# Patient Record
Sex: FEMALE | Race: OTHER
[De-identification: names, ages, dates, MRNs, and addresses within clinical notes are randomized per-mention and may not be internally consistent; named-entity substitution may affect disease eponyms.]

---

## 2023-08-31 ENCOUNTER — HOSPITAL ENCOUNTER (INPATIENT)
Dept: HOSPITAL 15 - ER | Age: 63
LOS: 3 days | Discharge: HOME | DRG: 204 | End: 2023-09-03
Admitting: INTERNAL MEDICINE
Payer: COMMERCIAL

## 2023-08-31 VITALS — HEART RATE: 77 BPM | RESPIRATION RATE: 16 BRPM | OXYGEN SATURATION: 96 %

## 2023-08-31 VITALS — HEIGHT: 61 IN | WEIGHT: 151.9 LBS | BODY MASS INDEX: 28.68 KG/M2

## 2023-08-31 DIAGNOSIS — N39.0: ICD-10-CM

## 2023-08-31 DIAGNOSIS — I65.29: ICD-10-CM

## 2023-08-31 DIAGNOSIS — E86.0: ICD-10-CM

## 2023-08-31 DIAGNOSIS — R55: Primary | ICD-10-CM

## 2023-08-31 DIAGNOSIS — I10: ICD-10-CM

## 2023-08-31 DIAGNOSIS — J45.909: ICD-10-CM

## 2023-08-31 DIAGNOSIS — Z90.710: ICD-10-CM

## 2023-08-31 DIAGNOSIS — M50.31: ICD-10-CM

## 2023-08-31 LAB
ALBUMIN SERPL-MCNC: 4.3 G/DL (ref 3.2–4.8)
ALP SERPL-CCNC: 76 U/L (ref 46–116)
ALT SERPL-CCNC: 21 U/L (ref 7–40)
ANION GAP SERPL CALCULATED.3IONS-SCNC: 7.2 MMOL/L (ref 5–15)
BILIRUB SERPL-MCNC: 0.3 MG/DL (ref 0.2–1)
BUN SERPL-MCNC: 19 MG/DL (ref 9–23)
BUN/CREAT SERPL: 26 (ref 10–20)
CALCIUM SERPL-MCNC: 9.1 MG/DL (ref 8.5–10.1)
CHLORIDE SERPL-SCNC: 109 MMOL/L (ref 98–107)
CO2 SERPL-SCNC: 24.8 MMOL/L (ref 20–30)
GLUCOSE SERPL-MCNC: 101 MG/DL (ref 74–106)
HCT VFR BLD AUTO: 38.9 % (ref 36–46)
HGB BLD-MCNC: 12.9 G/DL (ref 12.2–16.2)
MCH RBC QN AUTO: 29.4 PG (ref 28–32)
MCV RBC AUTO: 88.5 FL (ref 80–100)
NRBC BLD QL AUTO: 0.1 %
POTASSIUM SERPL-SCNC: 4.3 MMOL/L (ref 3.5–5.1)
PROT SERPL-MCNC: 6.8 G/DL (ref 5.7–8.2)
SODIUM SERPL-SCNC: 141 MMOL/L (ref 136–145)

## 2023-08-31 PROCEDURE — 81001 URINALYSIS AUTO W/SCOPE: CPT

## 2023-08-31 PROCEDURE — 70498 CT ANGIOGRAPHY NECK: CPT

## 2023-08-31 PROCEDURE — 80053 COMPREHEN METABOLIC PANEL: CPT

## 2023-08-31 PROCEDURE — 36415 COLL VENOUS BLD VENIPUNCTURE: CPT

## 2023-08-31 PROCEDURE — 96361 HYDRATE IV INFUSION ADD-ON: CPT

## 2023-08-31 PROCEDURE — 70450 CT HEAD/BRAIN W/O DYE: CPT

## 2023-08-31 PROCEDURE — 71045 X-RAY EXAM CHEST 1 VIEW: CPT

## 2023-08-31 PROCEDURE — 96375 TX/PRO/DX INJ NEW DRUG ADDON: CPT

## 2023-08-31 PROCEDURE — 96374 THER/PROPH/DIAG INJ IV PUSH: CPT

## 2023-08-31 PROCEDURE — 85025 COMPLETE CBC W/AUTO DIFF WBC: CPT

## 2023-08-31 PROCEDURE — 83880 ASSAY OF NATRIURETIC PEPTIDE: CPT

## 2023-08-31 PROCEDURE — 84443 ASSAY THYROID STIM HORMONE: CPT

## 2023-08-31 PROCEDURE — 93306 TTE W/DOPPLER COMPLETE: CPT

## 2023-08-31 PROCEDURE — 93886 INTRACRANIAL COMPLETE STUDY: CPT

## 2023-08-31 PROCEDURE — 80061 LIPID PANEL: CPT

## 2023-08-31 PROCEDURE — 84484 ASSAY OF TROPONIN QUANT: CPT

## 2023-08-31 PROCEDURE — 83036 HEMOGLOBIN GLYCOSYLATED A1C: CPT

## 2023-09-01 VITALS
DIASTOLIC BLOOD PRESSURE: 66 MMHG | OXYGEN SATURATION: 96 % | TEMPERATURE: 98.8 F | HEART RATE: 70 BPM | RESPIRATION RATE: 17 BRPM | SYSTOLIC BLOOD PRESSURE: 131 MMHG

## 2023-09-01 VITALS
RESPIRATION RATE: 16 BRPM | SYSTOLIC BLOOD PRESSURE: 134 MMHG | TEMPERATURE: 97.6 F | OXYGEN SATURATION: 97 % | HEART RATE: 76 BPM | DIASTOLIC BLOOD PRESSURE: 79 MMHG

## 2023-09-01 VITALS — HEART RATE: 66 BPM | RESPIRATION RATE: 13 BRPM | OXYGEN SATURATION: 96 %

## 2023-09-01 VITALS
TEMPERATURE: 98.8 F | SYSTOLIC BLOOD PRESSURE: 131 MMHG | DIASTOLIC BLOOD PRESSURE: 66 MMHG | OXYGEN SATURATION: 96 % | HEART RATE: 70 BPM | RESPIRATION RATE: 17 BRPM

## 2023-09-01 VITALS
HEART RATE: 69 BPM | OXYGEN SATURATION: 98 % | TEMPERATURE: 97.9 F | DIASTOLIC BLOOD PRESSURE: 72 MMHG | SYSTOLIC BLOOD PRESSURE: 121 MMHG | RESPIRATION RATE: 20 BRPM

## 2023-09-01 VITALS — HEART RATE: 73 BPM

## 2023-09-01 LAB
ALBUMIN SERPL-MCNC: 3.8 G/DL (ref 3.2–4.8)
ALP SERPL-CCNC: 74 U/L (ref 46–116)
ALT SERPL-CCNC: 16 U/L (ref 7–40)
ANION GAP SERPL CALCULATED.3IONS-SCNC: 6.1 MMOL/L (ref 5–15)
BILIRUB SERPL-MCNC: 0.3 MG/DL (ref 0.2–1)
BUN SERPL-MCNC: 12 MG/DL (ref 9–23)
BUN/CREAT SERPL: 16.2 (ref 10–20)
CALCIUM SERPL-MCNC: 8.5 MG/DL (ref 8.7–10.4)
CHLORIDE SERPL-SCNC: 111 MMOL/L (ref 98–107)
CHOLEST SERPL-MCNC: 176 MG/DL (ref ?–200)
CO2 SERPL-SCNC: 23.9 MMOL/L (ref 20–30)
GLUCOSE SERPL-MCNC: 111 MG/DL (ref 74–106)
HCT VFR BLD AUTO: 36 % (ref 36–46)
HDLC SERPL-MCNC: 54 MG/DL (ref 40–59)
HGB BLD-MCNC: 11.8 G/DL (ref 12.2–16.2)
MCH RBC QN AUTO: 29.4 PG (ref 28–32)
MCV RBC AUTO: 89.4 FL (ref 80–100)
NRBC BLD QL AUTO: 0.1 %
POTASSIUM SERPL-SCNC: 3.7 MMOL/L (ref 3.5–5.1)
PROT SERPL-MCNC: 6.1 G/DL (ref 5.7–8.2)
PROT UR STRIP.AUTO-MCNC: (no result) MG/DL
SODIUM SERPL-SCNC: 141 MMOL/L (ref 136–145)
TRIGL SERPL-MCNC: 136 MG/DL (ref ?–150)

## 2023-09-01 RX ADMIN — ACETAMINOPHEN PRN MG: 325 TABLET ORAL at 14:33

## 2023-09-02 VITALS
RESPIRATION RATE: 18 BRPM | HEART RATE: 77 BPM | DIASTOLIC BLOOD PRESSURE: 67 MMHG | SYSTOLIC BLOOD PRESSURE: 127 MMHG | TEMPERATURE: 98.2 F | OXYGEN SATURATION: 97 %

## 2023-09-02 VITALS
RESPIRATION RATE: 16 BRPM | TEMPERATURE: 97.7 F | OXYGEN SATURATION: 96 % | DIASTOLIC BLOOD PRESSURE: 70 MMHG | HEART RATE: 75 BPM | SYSTOLIC BLOOD PRESSURE: 122 MMHG

## 2023-09-02 VITALS
RESPIRATION RATE: 18 BRPM | TEMPERATURE: 97.7 F | OXYGEN SATURATION: 96 % | DIASTOLIC BLOOD PRESSURE: 77 MMHG | SYSTOLIC BLOOD PRESSURE: 129 MMHG | HEART RATE: 79 BPM

## 2023-09-02 VITALS
HEART RATE: 83 BPM | DIASTOLIC BLOOD PRESSURE: 73 MMHG | RESPIRATION RATE: 20 BRPM | OXYGEN SATURATION: 97 % | SYSTOLIC BLOOD PRESSURE: 146 MMHG | TEMPERATURE: 97.9 F

## 2023-09-02 VITALS — HEART RATE: 67 BPM

## 2023-09-02 VITALS — HEART RATE: 78 BPM

## 2023-09-02 LAB
ALBUMIN SERPL-MCNC: 3.8 G/DL (ref 3.2–4.8)
ALP SERPL-CCNC: 66 U/L (ref 46–116)
ALT SERPL-CCNC: 16 U/L (ref 7–40)
ANION GAP SERPL CALCULATED.3IONS-SCNC: 8.5 MMOL/L (ref 5–15)
BILIRUB SERPL-MCNC: 0.3 MG/DL (ref 0.2–1)
BUN SERPL-MCNC: 12 MG/DL (ref 9–23)
BUN/CREAT SERPL: 15.8 (ref 10–20)
CALCIUM SERPL-MCNC: 8.8 MG/DL (ref 8.5–10.1)
CHLORIDE SERPL-SCNC: 109 MMOL/L (ref 98–107)
CO2 SERPL-SCNC: 23.5 MMOL/L (ref 20–30)
GLUCOSE SERPL-MCNC: 95 MG/DL (ref 74–106)
HCT VFR BLD AUTO: 36.6 % (ref 36–46)
HGB BLD-MCNC: 12.1 G/DL (ref 12.2–16.2)
MCH RBC QN AUTO: 29.2 PG (ref 28–32)
MCV RBC AUTO: 88.7 FL (ref 80–100)
NRBC BLD QL AUTO: 0.1 %
POTASSIUM SERPL-SCNC: 3.8 MMOL/L (ref 3.5–5.1)
PROT SERPL-MCNC: 6.2 G/DL (ref 5.7–8.2)
SODIUM SERPL-SCNC: 141 MMOL/L (ref 136–145)

## 2023-09-02 RX ADMIN — ACETAMINOPHEN PRN MG: 325 TABLET ORAL at 00:19

## 2023-09-02 RX ADMIN — ACETAMINOPHEN PRN MG: 325 TABLET ORAL at 13:04

## 2023-09-03 VITALS
DIASTOLIC BLOOD PRESSURE: 71 MMHG | RESPIRATION RATE: 16 BRPM | SYSTOLIC BLOOD PRESSURE: 128 MMHG | OXYGEN SATURATION: 99 % | HEART RATE: 87 BPM | TEMPERATURE: 97.5 F

## 2023-09-03 VITALS
SYSTOLIC BLOOD PRESSURE: 128 MMHG | TEMPERATURE: 97.5 F | DIASTOLIC BLOOD PRESSURE: 71 MMHG | HEART RATE: 87 BPM | OXYGEN SATURATION: 98 % | RESPIRATION RATE: 16 BRPM

## 2023-09-03 VITALS
TEMPERATURE: 97.5 F | OXYGEN SATURATION: 95 % | RESPIRATION RATE: 16 BRPM | SYSTOLIC BLOOD PRESSURE: 122 MMHG | HEART RATE: 77 BPM | DIASTOLIC BLOOD PRESSURE: 63 MMHG

## 2023-09-03 VITALS — HEART RATE: 62 BPM

## 2023-09-03 VITALS
DIASTOLIC BLOOD PRESSURE: 69 MMHG | HEART RATE: 79 BPM | TEMPERATURE: 98 F | RESPIRATION RATE: 18 BRPM | SYSTOLIC BLOOD PRESSURE: 141 MMHG | OXYGEN SATURATION: 98 %

## 2023-09-03 RX ADMIN — ACETAMINOPHEN PRN MG: 325 TABLET ORAL at 00:41

## 2023-09-03 RX ADMIN — ACETAMINOPHEN PRN MG: 325 TABLET ORAL at 14:58

## 2024-12-17 ENCOUNTER — HOSPITAL ENCOUNTER (INPATIENT)
Dept: HOSPITAL 15 - ER | Age: 64
LOS: 2 days | Discharge: HOME | DRG: 113 | End: 2024-12-19
Payer: MEDICAID

## 2024-12-17 VITALS — WEIGHT: 143.3 LBS | BODY MASS INDEX: 27.06 KG/M2 | HEIGHT: 61 IN

## 2024-12-17 DIAGNOSIS — G89.29: ICD-10-CM

## 2024-12-17 DIAGNOSIS — J01.90: Primary | ICD-10-CM

## 2024-12-17 DIAGNOSIS — J45.909: ICD-10-CM

## 2024-12-17 DIAGNOSIS — Z85.41: ICD-10-CM

## 2024-12-17 DIAGNOSIS — Z79.899: ICD-10-CM

## 2024-12-17 DIAGNOSIS — G43.909: ICD-10-CM

## 2024-12-17 DIAGNOSIS — Z20.822: ICD-10-CM

## 2024-12-17 DIAGNOSIS — H54.7: ICD-10-CM

## 2024-12-17 LAB
ANION GAP SERPL CALCULATED.3IONS-SCNC: 6 MMOL/L (ref 5–15)
BUN SERPL-MCNC: 21 MG/DL (ref 9–23)
BUN/CREAT SERPL: 26.6 (ref 10–20)
CALCIUM SERPL-MCNC: 10.2 MG/DL (ref 8.7–10.4)
CHLORIDE SERPL-SCNC: 107 MMOL/L (ref 98–107)
CO2 SERPL-SCNC: 29 MMOL/L (ref 20–31)
GLUCOSE SERPL-MCNC: 90 MG/DL (ref 74–106)
HCT VFR BLD AUTO: 39.8 % (ref 36–46)
HGB BLD-MCNC: 13.2 G/DL (ref 12.2–16.2)
MCH RBC QN AUTO: 29.7 PG (ref 28–32)
MCV RBC AUTO: 89.8 FL (ref 80–100)
NRBC BLD QL AUTO: 0.1 %
POTASSIUM SERPL-SCNC: 4.1 MMOL/L (ref 3.5–5.1)
SODIUM SERPL-SCNC: 142 MMOL/L (ref 136–145)

## 2024-12-17 PROCEDURE — 74177 CT ABD & PELVIS W/CONTRAST: CPT

## 2024-12-17 PROCEDURE — 84443 ASSAY THYROID STIM HORMONE: CPT

## 2024-12-17 PROCEDURE — 83036 HEMOGLOBIN GLYCOSYLATED A1C: CPT

## 2024-12-17 PROCEDURE — 70450 CT HEAD/BRAIN W/O DYE: CPT

## 2024-12-17 PROCEDURE — 36415 COLL VENOUS BLD VENIPUNCTURE: CPT

## 2024-12-17 PROCEDURE — 86706 HEP B SURFACE ANTIBODY: CPT

## 2024-12-17 PROCEDURE — 83880 ASSAY OF NATRIURETIC PEPTIDE: CPT

## 2024-12-17 PROCEDURE — 86803 HEPATITIS C AB TEST: CPT

## 2024-12-17 PROCEDURE — 86038 ANTINUCLEAR ANTIBODIES: CPT

## 2024-12-17 PROCEDURE — 93886 INTRACRANIAL COMPLETE STUDY: CPT

## 2024-12-17 PROCEDURE — 86704 HEP B CORE ANTIBODY TOTAL: CPT

## 2024-12-17 PROCEDURE — 87340 HEPATITIS B SURFACE AG IA: CPT

## 2024-12-17 PROCEDURE — 80307 DRUG TEST PRSMV CHEM ANLYZR: CPT

## 2024-12-17 PROCEDURE — 82378 CARCINOEMBRYONIC ANTIGEN: CPT

## 2024-12-17 PROCEDURE — 81001 URINALYSIS AUTO W/SCOPE: CPT

## 2024-12-17 PROCEDURE — 86301 IMMUNOASSAY TUMOR CA 19-9: CPT

## 2024-12-17 PROCEDURE — 71260 CT THORAX DX C+: CPT

## 2024-12-17 PROCEDURE — 82105 ALPHA-FETOPROTEIN SERUM: CPT

## 2024-12-17 PROCEDURE — 80074 ACUTE HEPATITIS PANEL: CPT

## 2024-12-17 PROCEDURE — 84484 ASSAY OF TROPONIN QUANT: CPT

## 2024-12-17 PROCEDURE — 85025 COMPLETE CBC W/AUTO DIFF WBC: CPT

## 2024-12-17 PROCEDURE — 80048 BASIC METABOLIC PNL TOTAL CA: CPT

## 2024-12-17 PROCEDURE — 87426 SARSCOV CORONAVIRUS AG IA: CPT

## 2024-12-17 PROCEDURE — 86708 HEPATITIS A ANTIBODY: CPT

## 2024-12-17 PROCEDURE — 80053 COMPREHEN METABOLIC PANEL: CPT

## 2024-12-17 PROCEDURE — 87804 INFLUENZA ASSAY W/OPTIC: CPT

## 2024-12-17 PROCEDURE — 71046 X-RAY EXAM CHEST 2 VIEWS: CPT

## 2024-12-17 PROCEDURE — 86703 HIV-1/HIV-2 1 RESULT ANTBDY: CPT

## 2024-12-17 NOTE — DVH
EXAM: CT HEAD WITHOUT CONTRAST



HISTORY: Dizziness



COMPARISON: None



TECHNIQUE: Axial images were obtained and reformatted in coronal and sagittal planes.



All CT scans at this medical facility are performed using dose modulation techniques as appropriate t
o a performed exam including the following: Automated exposure control was utilized; adjustment of th
e MA and/or KV according to patient size; and use of iterative reconstruction technique. 



CT Dose: CTDI volume is 48.75 mGy. Dose-length product is 684.24 mGy*cm



FINDINGS:



Supratentorial Region:  No evidence for large acute territorial ischemia.  No intracranial hemorrhage
 is noted. 



Posterior Fossa:  No acute abnormality.



Brainstem:  Unremarkable.



Sellar/Suprasellar Region:  Unremarkable.



Ventricles, Cisterns, Sulci:  Age-appropriate.



Orbits:  Unremarkable.



Paranasal Sinuses:  Trace fluid in the left sphenoid sinus.



Mastoid Air Cells:  Unremarkable.



Vasculature:   Unremarkable. 



Bones/Soft Tissues:  No acute abnormality.



Other:  None.



IMPRESSION: 



1. No acute intracranial process.



2. Trace fluid in the left sphenoid sinus may reflect acute sinusitis.



Electronically Signed by: Madina Gibson at 12/17/2024 21:27:53 PM

## 2024-12-17 NOTE — ED.PDOC
History of Present Illness


HPI Comments


64 Y, with PMHX of asthma and arthrosclerosis, presents to the ED with CC of 

headache. Patient states that she has been having a headache with intermittent 

dizziness, palpitations, and vision loss for 2 weeks. Patient relays that her 

symptoms have gotten worse over the course of the week; and the heart palpi

tations have been more frequent during the night. Patient denies any tobacco 

usage, ETOH, or illicit drugs. Patient denies, fever, chills, body aches, nasal 

congestion, or N/V/D.


Chief Complaint:  Headache


Time Seen by MD:  15:00


Primary Care Provider:  HARRISON


Reviewed Notes:  Nurses Notes, Medications, Allergies


Allergies:  


Coded Allergies:  


     Codeine (Verified  Allergy, Unknown, 24)


     Penicillins (Verified  Allergy, Unknown, 24)


     Tramadol (Verified  Allergy, Unknown, 24)


Information Source:  Patient


Mode of Arrival:  Ambulatory


Severity:  Mild


Timing:  Weeks


Duration:  Since onset


Prehospital treatment:  None





Past Medical History


PAST MEDICAL HISTORY:  Asthma


Surgical History:  Denies all surgeries


GYN History:  No Pertinent GYN History





Family History


Family History:  Unknown





Social History


Smoker:  Non-Smoker


Alcohol:  Denies ETOH Use


Drugs:  Denies Drug Use


Lives In:  Home





Constitutional:  denies: chills, diaphoresis, fatigue, fever, malaise, sweats, 

weakness, others


EENTM:  reports: eye pain; denies: blurred vision, double vision, ear bleeding, 

ear discharge, ear drainage, ear pain, ear ringing, eye redness, hearing loss, 

mouth pain, mouth swelling, nasal discharge, nose bleeding, nose congestion, 

nose pain, photophobia, tearing, throat pain, throat swelling, voice changes, 

others


Respiratory:  denies: cough, hemoptysis, orthopnea, SOB at rest, shortness of 

breath, SOB with excertion, stridor, wheezing, others


Cardiovascular:  reports: palpitations; denies: chest pain, dizzy spells, 

diaphoresis, Dyspnea on exertion, edema, irregular heart beat, left arm pain, 

lightheadedness, PND, syncope, others


Gastrointestinal:  denies: abdomen distended, abdominal pain, blood streaked 

bowels, constipated, diarrhea, dysphagia, difficulty swallowing, hematemesis, 

melena, nausea, poor appetite, poor fluid intake, rectal bleeding, rectal pain, 

vomiting, others


Genitourinary:  denies: abnormal vagina bleeding, burning, dyspareunia, dysuria,

flank pain, frequency, hematuria, incontinence, pain, pregnant, vagina 

discharge, urgency, others


Neurological:  reports: dizziness, headache; denies: fainting, left sided 

numbness, left sided weakness, numbness, paresthesia, pre-existing deficit, 

right sided numbness, right sided weakness, seizure, speech problems, tingling, 

tremors, weakness, others


Musculoskeletal:  denies: back pain, gout, joint pain, joint swelling, muscle 

pain, muscle stiffness, neck pain, others


Integumetry:  denies: bruises, change in color, change in hair/nails, dryness, 

laceration, lesions, lumps, rash, wounds, others


Allergic/Immunocompromised:  denies: Difficulty Healing, Frequent Infections, 

Hives, Itching, others


Hematologic/Lymphatic:  denies: anemia, blood clots, easy bleeding, easy 

bruising, swollen glands, others


Endocrine:  denies: excessive hunger, excessive sweating, excessive thirst, 

excessive urination, flushing, intolerance to cold, intolerance to heat, 

unexplained weight gain, unexplained weight loss, others


Psychiatric:  denies: anxiety, bipolar disorder, depression, hopeless, panic 

disorder, schizophrenia, sleepless, suicidal, others


All Other Systems:  Reviewed and Negative





Physical Exam


General Appearance:  No Apparent Distress, Normal


HEENT:  Normal ENT Inspection, Pharynx Normal, TMs Normal


Neck:  Full Range of Motion, Non-Tender, Normal, Normal Inspection


Respiratory:  Chest Non-Tender, Lungs Clear, No Accessory Muscle Use, No Re

spiratory Distress, Normal Breath Sounds


Cardiovascular:  No Edema, No JVD, No Murmur, No Gallop, Normal Peripheral 

Pulses, Regular Rate/Rhythm


Breast Exam:  Deferred


Gastrointestinal:  No Organomegaly, Non Tender, No Pulsatile Mass, Normal Bowel 

Sounds, Soft


Genitalia:  Deferred


Pelvic:  Deferred


Rectal:  Deferred


Extremities:  No calf tenderness, Normal capillary refill, Normal inspection, 

Normal range of motion, Non-tender, No pedal edema


Musculoskeletal :  


   Apperance:  Normal


Neurologic:  Alert, CNs II-XII nml as Tested, No Motor Deficits, Normal Affect, 

Normal Mood, No Sensory Deficits


Cerebellar Function:  Normal


Reflexes:  Normal


Skin:  Dry, Normal Color, Warm


Lymphatic:  No Adenopathy





Was a procedure done?


Was a procedure done?:  No





Differential Dx


Considerations may include:


STRESS, sleep deprivation, ETOH withdrawal





X-Ray, Labs, Meds, VS





                                   Vital Signs








  Date Time  Temp Pulse Resp B/P (MAP) Pulse Ox O2 Delivery O2 Flow Rate FiO2


 


24 14:45 98.3 86 20 145/89 (107) 97   








                                       Lab








Test


 24


16:15 24


15:10 Range/Units


 


 


Troponin I High Sensitivity < 3 L 3 L </=34  ng/L


 


White Blood Count


 


 8.1 


 4.4-10.8


10^3/uL


 


Red Blood Count


 


 4.44 


 4.0-5.20


10^6/uL


 


Hemoglobin  13.2  12.2-16.2  g/dL


 


Hematocrit  39.8  36.0-46.0  %


 


Mean Corpuscular Volume  89.8  80.0-100.0  fL


 


Mean Corpuscular Hemoglobin  29.7  28.0-32.0  pg


 


Mean Corpuscular Hemoglobin


Concent 


 33.1 


 32.0-36.0  g/dL





 


Red Cell Distribution Width  14.0  11.8-14.3  %


 


Platelet Count


 


 307 


 140-450


10^3/uL


 


Mean Platelet Volume  7.6  6.9-10.8  fL


 


Neutrophils (%) (Auto)  59.5  37.0-80.0  %


 


Lymphocytes (%) (Auto)  26.5  10.0-50.0  %


 


Monocytes (%) (Auto)  8.3  0.0-12.0  %


 


Eosinophils (%) (Auto)  5.3  0.0-7.0  %


 


Basophils (%) (Auto)  0.4  0.0-2.0  %


 


Neutrophils # (Auto)


 


 4.8 


 1.6-8.6  10


^3/uL


 


Lymphocytes # (Auto)


 


 2.2 


 0.4-5.4  10


^3/uL


 


Monocytes # (Auto)  0.7  0-1.3  10 ^3/uL


 


Eosinophils # (Auto)  0.4  0-0.8  10 ^3/uL


 


Basophils # (Auto)  0  0-0.2  10 ^3/uL


 


Nucleated Red Blood Cells  0.1   %


 


Sodium Level  142  136-145  mmol/L


 


Potassium Level  4.1  3.5-5.1  mmol/L


 


Chloride Level  107    mmol/L


 


Carbon Dioxide Level  29  20-31  mmol/L


 


Anion Gap  6  5-15  


 


Blood Urea Nitrogen  21  9-23  mg/dL


 


Creatinine


 


 0.79 


 0.550-1.02


mg/dL


 


Glomerular Filtration Rate


Calc 


 84 


 >90  mL/min





 


BUN/Creatinine Ratio  26.6 H 10.0-20.0  


 


Serum Glucose  90    mg/dL


 


Calcium Level  10.2  8.7-10.4  mg/dL





[facility]


                 [address] [place] [id number]


                              Ph: [phone]





                               DIAGNOSTIC IMAGING


                      Diagnostic Imaging Report : [id number]


                                     Signed








PATIENT: LIANNA VILLEGAS   ACCT: [id number]        UNIT: [id number]


: 1960           LOC: ER                   ROOM / BED:  / 


AGE / SEX: 64 / F         ADM STATUS: REG ER        SERVICE DT: 24





ORDERING PHYSICIAN: CELIA COX MD


PROCEDURE(s): CXR2 - CHEST TWO VIEWS ROUTINE


REASON: weakness


ORDER NUMBER(s): [id number], ACCESSION NUMBER(s): [id number]








EXAM: XY CHEST TWO VIEWS ROUTINE





TECHNIQUE:   Two radiographic views of the chest





CLINICAL HISTORY: weakness





COMPARISON: None





Findings/Impression:





Frontal and lateral chest radiographs demonstrate no acute osseous or 

superficial soft tissue abnormalities.





The trachea is midline. The cardiac silhouette and mediastinum are within normal

limits.





No pneumothorax, pleural effusions, or consolidations.





Electronically Signed by: Uma Begum at 2024 15:25:25 PM











DICTATED BY: UMA BEGUM DO


DICTATED DATE/TIME: 24 152





SIGNED BY: UMA BEGUM DO


SIGNED DATE/TIME: 24 1525





CC: 





Time of 1ST Reevaluation:  15:30


Reevaluation 1ST:  Unchanged


Patient Education/Counseling:  Diagnosis, Treatment


Family Education/Counseling:  No Family Present


Additional Information


The following tests were ordered, and results were reviewed by me: CXR, EXG X3, 

TROPONIN X3, UA, CBC, CMP





I reviewed and agreed with the following test results read by other providers: 

CXR





I discussed treatment and results with medical personnel and: PATIENT





Departure 1


Departure


Time of Disposition:  17:33 (Patient was registered the same day under different

name.  Patient had a CT scan performed that was benign.Patient with dizziness 

near syncope and generally feeling unwell.  We will admit patient for further 

workup)


Impression:  


   Primary Impression:  


   Near syncope


   Additional Impression:  


   Migraine


   Qualified Codes:  G43.109 - Migraine with aura, not intractable, without 

   status migrainosus


Disposition:   ADMITTED AS INPATIENT


Admit to:  Med Surg


Condition:  Serious





Critical Care Note


Critical Care Time?:  No





Stability


Stability form required:  No





Heart Score


Heart Score:  








Heart Score Response (Comments) Value


 


History N/A 0


 


EKG N/A 0


 


Age N/A 0


 


Risk Factors N/A 0


 


Troponin N/A 0


 


Total  0














I personally scribed for CELIA COX MD ([email or web address]) on 24 at 15:30.  

Electronically submitted by Emily Reyes ([email or web address]).


I personally scribed for CELIA COX MD ([email or web address]) on 24 at 15:45.  

Electronically submitted by Emily Reyes ([email or web address]).





CELIA COX MD               Dec 17, 2024 15:30

## 2024-12-17 NOTE — DVHHPRES
History of Present Illness


Resident Creating Document:  BROCK RAMSEY RESIDENT


History of Present Illness


This is a 64 years old female with past medical history of bronchial asthma, 

aortic atherosclerosis, chronic low back pain presented to the ED with a chief 

complaint of pounding head, neck pain blurry vision and dizziness for 2 weeks.  

The patient she had diarrhea 2 weeks ago and it was loss for 3 days and her 

symptoms started after that.  She felt dizzy when she was standing up from the 

sitting or lying position but did not mentioned any history of fall and also 

mentioned recent heart racing and unintentional 11 lb weight loss in last 1 

month.  She denies fever, night sweat, malaise, altered bowel habit, recent 

traveling or any abdominal pain or any sick contact.  She is following primary 

care with [facility] and today she was referred from urgent care 

to the [facility] ED. She never had colonoscopy but mammography 2 years ago and it was 

normal and she had hysterectomy 2008 and stopped Pap test after that.


Past Medical History





Bronchial asthma, chronic low back pain, mild aortic atherosclerosis.


Past Surgical History





Hysterectomy in 2008, Lt rotator cuff surgery in 2012.


Family History


None


Past Social History





Nonsmoker, nonalcoholic and never tried any drugs.





Review of Systems


Constitutional:  No: Fever, Chills, Sweats, Weakness, Malaise, Other


Eyes:  Pain, Vision change; No: Conjunctivae inflammation, Eyelid inflammation, 

Other, Redness


ENT:  No: Ear pain, Ear discharge, Nose pain, Nose discharge, Nose congestion, 

Mouth pain, Mouth swelling, Throat pain, Throat swelling, Other


Respiratory:  No: Cough, Dry, Shortness of breath, SOB with excertion, Wheezing,

Hemoptysis, Pleuritic Pain, Sputum, Wheezing, Other


Cardiovascular:  Palpitations, Lt Headedness; No: Chest Pain, Orthopnea, 

Paroxysmal Noc. Dyspnea, Edema, Other


Gastrointestinal:  No: Nausea, Vomiting, Abdominal Pain, Diarrhea, Constipation,

Melena, Hematochezia, Other


Genitourinary:  No Dysuria, No Frequency, No Incontinence, No Hematuria, No 

Retention, No Other


Skin:  No: Rash, Lesions, Jaundice, Bruising, Other


Neurological:  No: Weakness, Numbness, Incoordination, Change in speech, 

Confusion, Seizures, Other


Allergies:  


Coded Allergies:  


     Codeine (Verified  Allergy, Unknown, 12/17/24)


     Penicillins (Verified  Allergy, Unknown, 12/17/24)


     Tramadol (Verified  Allergy, Unknown, 12/17/24)


Medications





                               Current Medications








 Medications  Dose


 Ordered  Sig/Analy


 Route  Start Time


 Stop Time Status Last Admin


Dose Admin


 


 Sodium Chloride  10 ml  Q8HR


 IV  12/17/24 22:00


   UNV  





 


 Ondansetron HCl  4 mg  Q4HP  PRN


 IV  12/17/24 21:00


   UNV  





 


 Acetaminophen  650 mg  Q6HP  PRN


 PO  12/17/24 21:00


   UNV  





 


 Nitroglycerin  0.4 mg  Q5MINP  PRN


 SL  12/17/24 21:00


   UNV  





 


 Morphine Sulfate  2 mg  Q30M  PRN


 IV  12/17/24 21:00


   UNV  














Exam


Vital Signs





Vital Signs








  Date Time  Temp Pulse Resp B/P (MAP) Pulse Ox O2 Delivery O2 Flow Rate FiO2


 


12/17/24 20:30 98.0 77 16 141/73 (95) 98   





 98.0       








Exam








Physical examination:








General Appearance:  Alert, Oriented X3, Cooperative, No acute distress


HEENT:  Atraumatic, PERRLA, EOMI, Mucous membrane moist/pink


Respiratory:  Clear to auscultation, Normal air movement


Cardiovascular:  Regular rate, Normal S1, Normal S2, No murmurs, no chest wall 

tenderness


Abdominal:  Normal bowel sounds, Soft, No tenderness, No hepatospenomegaly, No 

masses


Extremities:  No clubbing, No cyanosis, No edema, Normal pulses, No 

tenderness/swelling


Skin:  No rashes, No breakdown, No significant lesion


Neuro:  Normal gait, Normal speech, Strength at 5/5 X4 ext, Normal tone, 

Sensation intact, grossly intact cranial nerves.


Psych/Mental Status:  Mental status NL, Mood NL





Labs/Xrays





                                      Labs








Test


 12/17/24


16:15 12/17/24


15:10 Range/Units


 


 


Troponin I High Sensitivity < 3 L  </=34  ng/L


 


White Blood Count


 


 8.1 


 4.4-10.8


10^3/uL


 


Red Blood Count


 


 4.44 


 4.0-5.20


10^6/uL


 


Hemoglobin  13.2  12.2-16.2  g/dL


 


Hematocrit  39.8  36.0-46.0  %


 


Mean Corpuscular Volume  89.8  80.0-100.0  fL


 


Mean Corpuscular Hemoglobin  29.7  28.0-32.0  pg


 


Mean Corpuscular Hemoglobin


Concent 


 33.1 


 32.0-36.0  g/dL





 


Red Cell Distribution Width  14.0  11.8-14.3  %


 


Platelet Count


 


 307 


 140-450


10^3/uL


 


Mean Platelet Volume  7.6  6.9-10.8  fL


 


Neutrophils (%) (Auto)  59.5  37.0-80.0  %


 


Lymphocytes (%) (Auto)  26.5  10.0-50.0  %


 


Monocytes (%) (Auto)  8.3  0.0-12.0  %


 


Eosinophils (%) (Auto)  5.3  0.0-7.0  %


 


Basophils (%) (Auto)  0.4  0.0-2.0  %


 


Neutrophils # (Auto)


 


 4.8 


 1.6-8.6  10


^3/uL


 


Lymphocytes # (Auto)


 


 2.2 


 0.4-5.4  10


^3/uL


 


Monocytes # (Auto)  0.7  0-1.3  10 ^3/uL


 


Eosinophils # (Auto)  0.4  0-0.8  10 ^3/uL


 


Basophils # (Auto)  0  0-0.2  10 ^3/uL


 


Nucleated Red Blood Cells  0.1   %


 


Sodium Level  142  136-145  mmol/L


 


Potassium Level  4.1  3.5-5.1  mmol/L


 


Chloride Level  107    mmol/L


 


Carbon Dioxide Level  29  20-31  mmol/L


 


Anion Gap  6  5-15  


 


Blood Urea Nitrogen  21  9-23  mg/dL


 


Creatinine


 


 0.79 


 0.550-1.02


mg/dL


 


Glomerular Filtration Rate


Calc 


 84 


 >90  mL/min





 


BUN/Creatinine Ratio  26.6 H 10.0-20.0  


 


Serum Glucose  90    mg/dL


 


Calcium Level  10.2  8.7-10.4  mg/dL











Assessment/Plan


Assessment/Plan





Assessment and plan:





# Near syncopal event likely due to dehydration


# Rule out intracranial pathology, aortic stenosis


# Rule out malignancy, Hyperthyroidism


- Ordered orthostatic vital, CT head without contrast, carotid Doppler and echo


- Ordered TSH, FOBT, CEA, AFP and CA 19- 9


- Patient was given 1 L IV bolus


- Monitor vitals closely.





# Prediabetes, HbA1C 5.9


- Counseled the patient regarding low carb diet, lifestyle modification and 

physical exercise





# Chronic bronchial asthma


- Continue home meds.








Goal of care discussed with the patient for more than 17 minutes full code





Plan of treatment discussed with Dr. Dominguez


Plan discussed with:  Patient, Other


My Orders





                        Orders - FARDOUS,BROCK RESIDENT








Procedure Category Date Status





  Time 


 


Admit ADMIT 12/17/24 Transmitted





  20:47 


 


Code Status CODE 12/17/24 Transmitted





  20:47 


 


Sodium Chloride Lock PHA 12/17/24 Logged





 (Saline Lock Ns)  22:00 


 


Ondansetron Hcl PHA 12/17/24 Logged





 (Zofran)  21:00 


 


Complete Blood Count LAB 12/18/24 Verified





  04:00 


 


Comprehensive LAB 12/18/24 Verified





Metabolic Panel  04:00 


 


Echo 2d Mode Cardiac US 12/17/24 Logged





DOP  20:47 


 


Carotid Duplx W Color US 12/17/24 Logged





DOP  20:47 


 


Acetaminophen Tablet PHA 12/17/24 Logged





 (Tylenol Tablet)  21:00 


 


Nitroglycerin PHA 12/17/24 Logged





Sublingual (Ntrostat  21:00 


 


Morphine Sulfate PHA 12/17/24 Logged





Injection  21:00 


 


Oxygen By Nasal RT 12/17/24 Transmitted





Cannula  20:47 


 


Stat Ekg For Chest [facility] 12/17/24 In Process





Pain  20:47 


 


Notify Md Of Changes [facility] 12/17/24 In Process





From Base  20:47 


 


Telemetry Monitor For [facility] 12/17/24 In Process





24 Hours  20:47 


 


Emergency Dysrhythmia [facility] 12/17/24 In Process





Protocol  20:47 


 


Rhythm Strips Once [facility] 12/17/24 In Process





Every Shift  20:47 


 


Head Without Contrast CT 12/17/24 Logged





  20:51 


 


Thyroid Stimulating LAB 12/17/24 Logged





Hormone  20:51 


 


Stool Occult Blood LAB 12/17/24 Logged





  20:51 


 


Carcinoembryonic LAB 12/17/24 Logged





Antigen  20:51 


 


Afp Serum Tumor Marker LAB 12/17/24 Logged





  20:51 


 


Carbohydrate Antigen LAB 12/17/24 Logged





19-9   


 


Orthostatic Vital ED NURSING 12/17/24 Transmitted





Signs   


 


Urinalysis LAB 12/17/24 Logged





  20:53 


 


Drug Screen LAB 12/17/24 Logged





  20:53 


 


Comprehensive LAB 12/17/24 Verified





Hepatitis Panel  20:54 











Date of Service:  Dec 17, 2024


Billing Provider:  CARLITOS DOMINGUEZ MD


Common Visit Codes:  99223-INITIAL  INP/OBS CARE (HIGH)











BROCK RAMSEY RESIDENT        Dec 17, 2024 20:59


CARLITOS DOMINGUEZ MD            Dec 18, 2024 09:28

## 2024-12-17 NOTE — DVH
EXAM: XY CHEST TWO VIEWS ROUTINE



TECHNIQUE:   Two radiographic views of the chest



CLINICAL HISTORY: weakness



COMPARISON: None



Findings/Impression:



Frontal and lateral chest radiographs demonstrate no acute osseous or superficial soft tissue abnorma
lities.



The trachea is midline. The cardiac silhouette and mediastinum are within normal limits.



No pneumothorax, pleural effusions, or consolidations.



Electronically Signed by: Uma Begum at 12/17/2024 15:25:25 PM

## 2024-12-18 VITALS — OXYGEN SATURATION: 97 % | HEART RATE: 62 BPM | RESPIRATION RATE: 17 BRPM

## 2024-12-18 VITALS — HEART RATE: 66 BPM | RESPIRATION RATE: 18 BRPM | OXYGEN SATURATION: 97 %

## 2024-12-18 VITALS
OXYGEN SATURATION: 97 % | HEART RATE: 68 BPM | TEMPERATURE: 98.2 F | SYSTOLIC BLOOD PRESSURE: 122 MMHG | DIASTOLIC BLOOD PRESSURE: 58 MMHG | RESPIRATION RATE: 18 BRPM

## 2024-12-18 VITALS
SYSTOLIC BLOOD PRESSURE: 138 MMHG | TEMPERATURE: 97.8 F | OXYGEN SATURATION: 97 % | HEART RATE: 66 BPM | RESPIRATION RATE: 16 BRPM | DIASTOLIC BLOOD PRESSURE: 66 MMHG

## 2024-12-18 VITALS
TEMPERATURE: 97.9 F | SYSTOLIC BLOOD PRESSURE: 146 MMHG | DIASTOLIC BLOOD PRESSURE: 78 MMHG | HEART RATE: 76 BPM | RESPIRATION RATE: 20 BRPM | OXYGEN SATURATION: 97 %

## 2024-12-18 LAB
ALBUMIN SERPL-MCNC: 4.2 G/DL (ref 3.2–4.8)
ALP SERPL-CCNC: 78 U/L (ref 46–116)
ALT SERPL-CCNC: 18 U/L (ref 7–40)
ANION GAP SERPL CALCULATED.3IONS-SCNC: 7 MMOL/L (ref 5–15)
BILIRUB SERPL-MCNC: 0.5 MG/DL (ref 0.2–1)
BUN SERPL-MCNC: 16 MG/DL (ref 9–23)
BUN/CREAT SERPL: 20.3 (ref 10–20)
CALCIUM SERPL-MCNC: 9.6 MG/DL (ref 8.7–10.4)
CHLORIDE SERPL-SCNC: 110 MMOL/L (ref 98–107)
CO2 SERPL-SCNC: 25 MMOL/L (ref 20–31)
GLUCOSE SERPL-MCNC: 102 MG/DL (ref 74–106)
HCT VFR BLD AUTO: 38.7 % (ref 36–46)
HGB BLD-MCNC: 12.8 G/DL (ref 12.2–16.2)
MCH RBC QN AUTO: 30.3 PG (ref 28–32)
MCV RBC AUTO: 92 FL (ref 80–100)
NRBC BLD QL AUTO: 0.1 %
POTASSIUM SERPL-SCNC: 3.5 MMOL/L (ref 3.5–5.1)
PROT SERPL-MCNC: 6.8 G/DL (ref 5.7–8.2)
SARS-COV+SARS-COV-2 AG RESP QL IA.RAPID: NEGATIVE
SODIUM SERPL-SCNC: 142 MMOL/L (ref 136–145)

## 2024-12-18 RX ADMIN — KETOROLAC TROMETHAMINE ONE MG: 30 INJECTION, SOLUTION INTRAMUSCULAR; INTRAVENOUS at 09:01

## 2024-12-18 RX ADMIN — SODIUM CHLORIDE SCH ML: 9 INJECTION INTRAMUSCULAR; INTRAVENOUS; SUBCUTANEOUS at 03:18

## 2024-12-18 RX ADMIN — DIATRIZOATE MEGLUMINE AND DIATRIZOATE SODIUM ONE ML: 660; 100 LIQUID ORAL; RECTAL at 08:22

## 2024-12-18 RX ADMIN — ONDANSETRON HYDROCHLORIDE PRN MG: 2 INJECTION, SOLUTION INTRAMUSCULAR; INTRAVENOUS at 09:01

## 2024-12-18 RX ADMIN — KETOROLAC TROMETHAMINE ONE MG: 30 INJECTION, SOLUTION INTRAMUSCULAR; INTRAVENOUS at 15:29

## 2024-12-18 RX ADMIN — SODIUM CHLORIDE ONE MLS/HR: 0.9 INJECTION, SOLUTION INTRAVENOUS at 03:17

## 2024-12-18 RX ADMIN — PANTOPRAZOLE SODIUM ONE MG: 40 TABLET, DELAYED RELEASE ORAL at 09:02

## 2024-12-18 RX ADMIN — IOHEXOL ONE MG: 300 INJECTION, SOLUTION INTRAVENOUS at 10:23

## 2024-12-18 RX ADMIN — ACETAMINOPHEN PRN MG: 325 TABLET ORAL at 04:35

## 2024-12-18 NOTE — DVH
Carotid Duplex



Date: 12/18/2024 08:46 AM



Clinical History: Dizziness



Comparison: None



Technique: 



Duplex Doppler evaluation of the extracranial carotid and vertebral arteries including color Doppler 
and spectral/pulsed waveform analysis was performed.



Findings: 



RIGHT SIDE:



The peak systolic velocities are 81 cm/s in the distal CCA and 108 cm/s in the proximal ICA.The ICA/C
CA ratio is 1.3.



The external carotid artery is patent with peak systolic velocity of 82 cm/s proximally.



There is appropriate antegrade flow in the right vertebral artery.



LEFT SIDE: 



The peak systolic velocities are 72 cm/s in the distal CCA and  111 cm/s in the proximal ICA.. The IC
A/CCA ratio is 1.5.



The external carotid artery is patent with peak systolic velocity of 54 cm/s proximally.



There is appropriate antegrade flow in the left vertebral artery.



IMPRESSION: 



No hemodynamically significant stenosis noted in the right carotid system.



No hemodynamically significant stenosis noted in the left carotid system.



Reference: Radiology 2003; 229:[id number]



Electronically Signed by: Anuj Huizar at 12/18/2024 10:00:23 AM

## 2024-12-18 NOTE — DVHDSRES
Discharge Summary


Date of Admission


Resident Creating Document:  BROCK RAMSEY RESIDENT


Dec 17, 2024 at 20:47





Date of Discharge:


Dec 18, 2024





Admitting Diagnosis


Headache





Labs/Diagnostic Data:





                               Laboratory Results








Test


 12/18/24


07:45 12/18/24


06:00 12/18/24


03:02 12/17/24


21:00


 


Urine Color


 Light-yellow


(Yellow) 


 


 





 


Urine Clarity Clear (Clear)    


 


Urine pH 6.5 (5.0-9.0)    


 


Urine Specific Gravity


 1.018


(1.001-1.035) 


 


 





 


Urine Protein


 Negative


(Negative) 


 


 





 


Urine Ketones


 Negative


(Negative) 


 


 





 


Urine Blood


 Negative /uL


(Negative) 


 


 





 


Urine Nitrite


 Negative


(Negative) 


 


 





 


Urine Bilirubin


 Negative


(Negative) 


 


 





 


Urine Urobilinogen


 Normal mg/dL


(Negative) 


 


 





 


Urine Leukocyte Esterase


 1+ /uL


(Negative) 


 


 





 


Urine RBC 1 /hpf (0 - 4)    


 


Urine WBC 2 /hpf (0 - 5)    


 


Urine Squamous Epithelial


Cells Few /hpf (<5) 


 


 


 





 


Urine Bacteria


 None seen /hpf


(None Seen) 


 


 





 


Urine Mucus


 Few (None


Seen) 


 


 





 


Urine Glucose


 2+ mg/dL


(Normal) 


 


 





 


Urine Opiates Screen Neg (NEGATIVE)    


 


Urine Fentanyl Screen Neg (NEGATIVE)    


 


Urine Barbiturates Screen Neg (NEGATIVE)    


 


Urine Phencyclidine Screen Neg (NEGATIVE)    


 


Urine Amphetamines Screen Neg (NEGATIVE)    


 


Urine Benzodiazepines Screen Neg (NEGATIVE)    


 


Urine Cocaine Screen Neg (NEGATIVE)    


 


Urine Cannabinoids Screen Neg (NEGATIVE)    


 


White Blood Count


 


 9.2 10^3/uL


(4.4-10.8) 


 





 


Red Blood Count


 


 4.21 10^6/uL


(4.0-5.20) 


 





 


Hemoglobin


 


 12.8 g/dL


(12.2-16.2) 


 





 


Hematocrit


 


 38.7 %


(36.0-46.0) 


 





 


Mean Corpuscular Volume


 


 92.0 fL


(80.0-100.0) 


 





 


Mean Corpuscular Hemoglobin


 


 30.3 pg


(28.0-32.0) 


 





 


Mean Corpuscular Hemoglobin


Concent 


 33.0 g/dL


(32.0-36.0) 


 





 


Red Cell Distribution Width


 


 14.0 %


(11.8-14.3) 


 





 


Platelet Count


 


 280 10^3/uL


(140-450) 


 





 


Mean Platelet Volume


 


 7.7 fL


(6.9-10.8) 


 





 


Neutrophils (%) (Auto)


 


 55.8 %


(37.0-80.0) 


 





 


Lymphocytes (%) (Auto)


 


 30.5 %


(10.0-50.0) 


 





 


Monocytes (%) (Auto)


 


 8.6 %


(0.0-12.0) 


 





 


Eosinophils (%) (Auto)


 


 4.5 %


(0.0-7.0) 


 





 


Basophils (%) (Auto)


 


 0.6 %


(0.0-2.0) 


 





 


Neutrophils # (Auto)


 


 5.1 10 ^3/uL


(1.6-8.6) 


 





 


Lymphocytes # (Auto)


 


 2.8 10 ^3/uL


(0.4-5.4) 


 





 


Monocytes # (Auto)


 


 0.8 10 ^3/uL


(0-1.3) 


 





 


Eosinophils # (Auto)


 


 0.4 10 ^3/uL


(0-0.8) 


 





 


Basophils # (Auto)


 


 0.1 10 ^3/uL


(0-0.2) 


 





 


Nucleated Red Blood Cells  0.1 %   


 


Sodium Level


 


 142 mmol/L


(136-145) 


 





 


Potassium Level


 


 3.5 mmol/L


(3.5-5.1) 


 





 


Chloride Level


 


 110 mmol/L


() 


 





 


Carbon Dioxide Level


 


 25 mmol/L


(20-31) 


 





 


Anion Gap  7 (5-15)   


 


Blood Urea Nitrogen


 


 16 mg/dL


(9-23) 


 





 


Creatinine


 


 0.79 mg/dL


(0.550-1.02) 


 





 


Glomerular Filtration Rate


Calc 


 84 mL/min


(>90) 


 





 


BUN/Creatinine Ratio


 


 20.3


(10.0-20.0) 


 





 


Serum Glucose


 


 102 mg/dL


() 


 





 


Calcium Level


 


 9.6 mg/dL


(8.7-10.4) 


 





 


Total Bilirubin


 


 0.5 mg/dL


(0.2-1.0) 


 





 


Aspartate Amino Transferase


(AST) 


 16 U/L (13-40) 


 


 





 


Alanine Aminotransferase (ALT)  18 U/L (7-40)   


 


Alkaline Phosphatase


 


 78 U/L


() 


 





 


Total Protein


 


 6.8 g/dL


(5.7-8.2) 


 





 


Albumin


 


 4.2 g/dL


(3.2-4.8) 


 





 


Influenza Type A Antigen


 


 


 Negative


(Negative) 





 


Influenza Type B Antigen


 


 


 Negative


(Negative) 





 


SARS-CoV-2 Antigen (Rapid)


 


 


 Negative


(NEGATIVE) 





 


Hemoglobin A1c


 


 


 


 5.9 % A1C


(<5.7)


 


B-Type Natriuretic Peptide


 


 


 


 14.88 pg/mL


(0-100)


 


Carcinoembryonic Antigen


 


 


 


 1.00 ng/mL


(<=5.0)


 


Thyroid Stimulating Hormone


(TSH) 


 


 


 0.69 uIU/mL


(0.55-4.78)


 


HIV (1&2) Antibody


 


 


 


 Negative


(Negative)


 


Test


 12/17/24


16:15 


 


 





 


Troponin I High Sensitivity


 < 3 ng/L


(</=34) 


 


 








                             Other Laboratory Tests


12/18/24 06:00











Brief Hx & Hospital Course:


This is a 64 years old female with past medical history of bronchial asthma, 

aortic atherosclerosis, chronic low back pain presented to the ED with a chief 

complaint of pounding head, neck pain blurry vision and dizziness for 2 weeks.  

The patient she had diarrhea 2 weeks ago and it was loss for 3 days and her 

symptoms started after that.  She felt dizzy when she was standing up from the 

sitting or lying position but did not mentioned any history of fall and also 

mentioned recent heart racing and unintentional 11 lb weight loss in last 1 

month.  She denies fever, night sweat, malaise, altered bowel habit, recent 

traveling or any abdominal pain or any sick contact.  She is following primary 

care with [facility] and today she was referred from urgent care 

to the [facility] ED. She never had colonoscopy but mammography 2 years ago and it was 

normal and she had hysterectomy 2008 and stopped Pap test after that.





Hospital course: 


CXR showed no acute osseous or superficial soft tissue abnormalities.  Head CT 

showed no acute intracranial process, trace fluid in the left sphenoid sinus may

reflect acute sinusitis.  Carotid ultrasound showed no hemodynamically 

significant stenosis in the right or left carotid systems.  CT chest, abdomen 

and pelvis with IV and oral contrast showed no acute findings involving the 

chest, abdomen or pelvis.  Few tiny hepatic cysts.  CBC and BMP was 

unremarkable, COVID, influenza and HIV were also negative.  On the day of 

discharge, patient appeared well and had stable vital signs.  Patient was 

instructed to follow up with her PCP at her earliest convenience.  Her hospital 

course was uncomplicated.  Patient was sent Augmentin 875 mg b.i.d. for 7 days 

for her acute sinusitis.








General Appearance:  Alert, Oriented X3, Cooperative, No acute distress


HEENT:  Atraumatic, PERRLA, EOMI, Mucous membrane moist/pink


Respiratory:  Clear to auscultation, Normal air movement


Cardiovascular:  Regular rate, Normal S1, Normal S2, No murmurs, no chest wall 

tenderness


Abdominal:  Normal bowel sounds, Soft, No tenderness, No hepatospenomegaly, No 

masses


Extremities:  No clubbing, No cyanosis, No edema, Normal pulses, No 

tenderness/swelling


Skin:  No rashes, No breakdown, No significant lesion


Neuro:  Normal gait, Normal speech, Strength at 5/5 X4 ext, Normal tone, 

Sensation intact, grossly intact cranial nerves.


Psych/Mental Status:  Mental status NL, Mood NL





Condition at Discharge:


Good





Final Diagnosis/Problems List





acute sinusitis 





migraine





ruled out near syncope





personal hx of cervical cancer, s/p hystrectomy (2008)





asthma, currently not in exacerbation





Discharge Disposition:


Home





Discharge Instruct/Medications


Diet:  Cardiac 2g Na,low cholest


Activity:  No Restrictions, As Tolerated


Follow Up/Referral:  


please follow up with pcp in 1-2 weeks


Medications:  


augmentin 875mg twice a day for 7 days


Discharge Statement:


"Patient was advised to return to the ER or call 911 if any headaches, 

dizziness, shortness of breath, chest pain, abdominal pain, bleeding, fevers, or

worsening of medical condition.





Patient was counseled about treatment plan, medications, possible side effects, 

patientverbalized understanding. All questions were answered to the best of my 

ability.





This discharge took greater then 30 minutes in planning, reviewing 

documentation, counseling the patient, and discussing with other team members."





ASSESSMENT


acute sinusitis 


migraine


ruled out near syncope


personal hx of cervical cancer, s/p hystrectomy (2008)


asthma, currently not in exacerbation





Date of Service:  Dec 18, 2024


Billing Provider:  PARRISH TRUJILLO MD


Common Visit Codes:  07600-TWI/OBS DISCH DAY >30min





Coding Comment


Comment


Attending Attestation





I saw and evaluated the patient. I reviewed the residents note and agree with


findings and plan as documented in the residents note except as documented 

below.











GREGG TREJO RESIDENT             Dec 18, 2024 18:52


PARRISH TRUJILLO MD               Dec 19, 2024 08:31

## 2024-12-18 NOTE — DVH
Exam: CT CT CHST AB PLV W CON-ORAL   IV



History: unexplained wt loss/ RESIDENT WANTS ORAL AND IV CON



Comparison Study: None available at time of dictation.



Technique: Multidetector spiral CT of the chest, abdomen and pelvis was performed from lower neck to 
pubic symphysis. 100 cc Omni 300 intravenous contrast was administered during this examination.  Port
al venous  imaging was obtained. Axial, coronal and sagittal multiplanar reformats were performed by 
the technologist on a separate workstation. 



Radiation Dose : 



Chest/Abdomen/Pelvis:        CTDIvol  8 mGy, DLP  543.31 mGy*cm.



Findings: 



Lower neck: Normal thyroid.



Lungs: No focal consolidation, pleural effusion or pneumothorax.



Heart/Vascular Structures: Normal heart size. No pericardial effusion.



Lymph Nodes: No adenopathy



Pleura: No pleural effusion or significant pneumothorax.



Liver: Few subcentimeter hepatic cysts.



Gallbladder and biliary Tree: Unremarkable



Spleen: Unremarkable



Pancreas: The pancreas is normal in appearance without focal lesions or abnormal enhancement.



Adrenal Glands: Unremarkable 



Kidneys: Kidneys demonstrate normal symmetric enhancement without focal lesions, calculi or hydroneph
rosis. 



Bladder: Unremarkable



Bowel: The stomach is grossly normal in appearance. Small bowel and colon are normal in caliber and d
istribution.  The appendix is not visualized; however, no secondary findings of acute appendicitis id
entified.



Ascites: Absent



Lymphadenopathy: No mesenteric, retroperitoneal or periportal lymphadenopathy. 



Abdominal wall and Mesentery: Unremarkable.



Vasculature: The visualized abdominal aorta is normal in size and caliber.  Abdominal and pelvic vess
els demonstrate normal enhancement.



Pelvic Organs: The uterus is surgically absent.



Musculoskeletal: Grade 1 anterolisthesis of L4 on L5.   



IMPRESSION: 



1. No acute findings involving the chest, abdomen or pelvis. Few tiny hepatic cysts. 



HS:Y 



Electronically Signed by: Ernie Fuller at 12/18/2024 10:57:14 AM

## 2024-12-19 VITALS
DIASTOLIC BLOOD PRESSURE: 71 MMHG | SYSTOLIC BLOOD PRESSURE: 150 MMHG | HEART RATE: 58 BPM | TEMPERATURE: 97.9 F | OXYGEN SATURATION: 98 % | RESPIRATION RATE: 20 BRPM

## 2024-12-19 VITALS — HEART RATE: 58 BPM | RESPIRATION RATE: 20 BRPM | OXYGEN SATURATION: 98 %

## 2024-12-19 VITALS
DIASTOLIC BLOOD PRESSURE: 61 MMHG | OXYGEN SATURATION: 96 % | SYSTOLIC BLOOD PRESSURE: 118 MMHG | TEMPERATURE: 98 F | RESPIRATION RATE: 17 BRPM | HEART RATE: 62 BPM

## 2024-12-19 VITALS
HEART RATE: 58 BPM | TEMPERATURE: 97.9 F | SYSTOLIC BLOOD PRESSURE: 150 MMHG | OXYGEN SATURATION: 98 % | DIASTOLIC BLOOD PRESSURE: 71 MMHG | RESPIRATION RATE: 20 BRPM

## 2024-12-19 VITALS
SYSTOLIC BLOOD PRESSURE: 114 MMHG | DIASTOLIC BLOOD PRESSURE: 53 MMHG | HEART RATE: 78 BPM | TEMPERATURE: 97.7 F | OXYGEN SATURATION: 97 % | RESPIRATION RATE: 18 BRPM

## 2024-12-19 VITALS
OXYGEN SATURATION: 97 % | TEMPERATURE: 97.7 F | DIASTOLIC BLOOD PRESSURE: 53 MMHG | SYSTOLIC BLOOD PRESSURE: 114 MMHG | RESPIRATION RATE: 18 BRPM | HEART RATE: 78 BPM

## 2024-12-19 RX ADMIN — PANTOPRAZOLE SODIUM SCH MG: 40 TABLET, DELAYED RELEASE ORAL at 05:42

## 2024-12-19 NOTE — DVHPNRES
Progress Note


Date Seen:  Dec 19, 2024


Resident Creating Document:  GREGG TREJO RESIDENT


Medical Necessity Reason


Pt with a Central, PICC or Fol:  No





Subjective


Review of Systems


This is a 64 years old female with past medical history of bronchial asthma, 

aortic atherosclerosis, chronic low back pain presented to the ED with a chief 

complaint of pounding head, neck pain blurry vision and dizziness for 2 weeks.  

The patient she had diarrhea 2 weeks ago and it was loss for 3 days and her 

symptoms started after that.  She felt dizzy when she was standing up from the 

sitting or lying position but did not mentioned any history of fall and also 

mentioned recent heart racing and unintentional 11 lb weight loss in last 1 

month.  She denies fever, night sweat, malaise, altered bowel habit, recent 

traveling or any abdominal pain or any sick contact.  She is following primary 

care with [facility] and today she was referred from urgent care 

to the [facility] ED. She never had colonoscopy but mammography 2 years ago and it was 

normal and she had hysterectomy 2008 and stopped Pap test after that.





Objective


vital signs





                                   Vital Sign








  Date Time  Temp Pulse Resp B/P (MAP) Pulse Ox O2 Delivery O2 Flow Rate FiO2


 


12/19/24 08:56 97.9 58 20 150/71 (97) 98   





 97.9       


 


12/19/24 08:05      Room Air* 0 21














                           Total Intake and Output   


 


 12/18/24 12/18/24 12/19/24





 15:00 23:00 07:00


 


Intake Total 1000 ml 210 ml 1200 ml


 


Balance 1000 ml 210 ml 1200 ml








Examination


General Appearance:  Alert, Oriented X3, Cooperative, No acute distress


HEENT:  Atraumatic, PERRLA, EOMI, Mucous membrane moist/pink


Respiratory:  Clear to auscultation, Normal air movement


Cardiovascular:  Regular rate, Normal S1, Normal S2, No murmurs, no chest wall 

tenderness


Abdominal:  Normal bowel sounds, Soft, No tenderness, No hepatospenomegaly, No 

masses


Extremities:  No clubbing, No cyanosis, No edema, Normal pulses, No 

tenderness/swelling


Skin:  No rashes, No breakdown, No significant lesion


Neuro:  Normal gait, Normal speech, Strength at 5/5 X4 ext, Normal tone, 

Sensation intact, grossly intact cranial nerves.


Psych/Mental Status:  Mental status NL, Mood NL


laboratory and microbiology


                                Laboratory Tests


12/18/24 06:00

















Test


 12/18/24


06:00 Range/Units


 


 


Serum Glucose 102    mg/dL











Problem List/Assessment/Plan


Problem List/Assessment/Plan


Acute on chronic sinusitis


migraine


ruled out near syncope


- CXR showed no acute osseous or superficial soft tissue abnormalities. 


- Head CT showed no acute intracranial process, trace fluid in the left sphenoid

sinus may reflect acute sinusitis. 


- Carotid ultrasound showed no hemodynamically significant stenosis in the right

or left carotid systems. 


- CT chest, abdomen and pelvis with IV and oral contrast showed no acute 

findings involving the chest, abdomen or pelvis.  Few tiny hepatic cysts


- IV toradol 15mg bid





personal hx of cervical cancer, s/p hystrectomy (2008)


- AFP, CEA,  negative





asthma, currently not in exacerbation








Plan discussed with Dr. Trujillo


Goals of care: Full code; discussed for >15 mins on 12/18/24


Plan discussed with:  Patient, Other (RN)





Date of Service:  Dec 20, 2024


Billing Provider:  PARRISH TRUJILLO MD


Common Visit Codes:  35589-IOJDTMEUSH INP/OBS CARE(HIGH)











GREGG TREJO RESIDENT             Dec 19, 2024 19:42


PARRISH TRUJILLO MD               Dec 21, 2024 17:36